# Patient Record
Sex: MALE | Race: WHITE | Employment: FULL TIME | URBAN - METROPOLITAN AREA
[De-identification: names, ages, dates, MRNs, and addresses within clinical notes are randomized per-mention and may not be internally consistent; named-entity substitution may affect disease eponyms.]

---

## 2018-02-13 PROCEDURE — 88305 TISSUE EXAM BY PATHOLOGIST: CPT | Performed by: UROLOGY

## 2018-02-13 PROCEDURE — 88344 IMHCHEM/IMCYTCHM EA MLT ANTB: CPT | Performed by: UROLOGY

## 2018-04-04 PROBLEM — L71.0 PERIORAL DERMATITIS: Status: ACTIVE | Noted: 2018-04-04

## 2018-09-13 ENCOUNTER — APPOINTMENT (OUTPATIENT)
Dept: GENERAL RADIOLOGY | Facility: HOSPITAL | Age: 63
DRG: 309 | End: 2018-09-13
Payer: COMMERCIAL

## 2018-09-13 ENCOUNTER — HOSPITAL ENCOUNTER (INPATIENT)
Facility: HOSPITAL | Age: 63
LOS: 1 days | Discharge: HOME | DRG: 309 | End: 2018-09-14
Attending: EMERGENCY MEDICINE | Admitting: HOSPITALIST
Payer: COMMERCIAL

## 2018-09-13 DIAGNOSIS — I48.91 ATRIAL FIBRILLATION WITH RAPID VENTRICULAR RESPONSE (HCC): Primary | ICD-10-CM

## 2018-09-13 LAB
ALBUMIN SERPL-MCNC: 3.8 G/DL (ref 3.5–4.8)
ALBUMIN/GLOB SERPL: 1.1 {RATIO} (ref 1–2)
ALP LIVER SERPL-CCNC: 106 U/L (ref 45–117)
ALT SERPL-CCNC: 22 U/L (ref 17–63)
ANION GAP SERPL CALC-SCNC: 7 MMOL/L (ref 0–18)
AST SERPL-CCNC: 15 U/L (ref 15–41)
ATRIAL RATE: 119 BPM
BASOPHILS # BLD AUTO: 0.02 X10(3) UL (ref 0–0.1)
BASOPHILS NFR BLD AUTO: 0.3 %
BILIRUB SERPL-MCNC: 0.3 MG/DL (ref 0.1–2)
BUN BLD-MCNC: 17 MG/DL (ref 8–20)
BUN/CREAT SERPL: 19.5 (ref 10–20)
CALCIUM BLD-MCNC: 8.8 MG/DL (ref 8.3–10.3)
CHLORIDE SERPL-SCNC: 110 MMOL/L (ref 101–111)
CO2 SERPL-SCNC: 25 MMOL/L (ref 22–32)
CREAT BLD-MCNC: 0.87 MG/DL (ref 0.7–1.3)
EOSINOPHIL # BLD AUTO: 0.08 X10(3) UL (ref 0–0.3)
EOSINOPHIL NFR BLD AUTO: 1.2 %
ERYTHROCYTE [DISTWIDTH] IN BLOOD BY AUTOMATED COUNT: 12.6 % (ref 11.5–16)
GLOBULIN PLAS-MCNC: 3.6 G/DL (ref 2.5–4)
GLUCOSE BLD-MCNC: 111 MG/DL (ref 70–99)
HCT VFR BLD AUTO: 45.9 % (ref 37–53)
HGB BLD-MCNC: 15.6 G/DL (ref 13–17)
IMMATURE GRANULOCYTE COUNT: 0.01 X10(3) UL (ref 0–1)
IMMATURE GRANULOCYTE RATIO %: 0.2 %
LYMPHOCYTES # BLD AUTO: 1.69 X10(3) UL (ref 0.9–4)
LYMPHOCYTES NFR BLD AUTO: 25.9 %
M PROTEIN MFR SERPL ELPH: 7.4 G/DL (ref 6.1–8.3)
MCH RBC QN AUTO: 31 PG (ref 27–33.2)
MCHC RBC AUTO-ENTMCNC: 34 G/DL (ref 31–37)
MCV RBC AUTO: 91.1 FL (ref 80–99)
MONOCYTES # BLD AUTO: 0.66 X10(3) UL (ref 0.1–1)
MONOCYTES NFR BLD AUTO: 10.1 %
NEUTROPHIL ABS PRELIM: 4.06 X10 (3) UL (ref 1.3–6.7)
NEUTROPHILS # BLD AUTO: 4.06 X10(3) UL (ref 1.3–6.7)
NEUTROPHILS NFR BLD AUTO: 62.3 %
OSMOLALITY SERPL CALC.SUM OF ELEC: 296 MOSM/KG (ref 275–295)
PLATELET # BLD AUTO: 194 10(3)UL (ref 150–450)
POTASSIUM SERPL-SCNC: 3.9 MMOL/L (ref 3.6–5.1)
PRO-BETA NATRIURETIC PEPTIDE: 112 PG/ML (ref ?–125)
Q-T INTERVAL: 268 MS
QRS DURATION: 82 MS
QTC CALCULATION (BEZET): 391 MS
R AXIS: 19 DEGREES
RBC # BLD AUTO: 5.04 X10(6)UL (ref 4.3–5.7)
RED CELL DISTRIBUTION WIDTH-SD: 41.4 FL (ref 35.1–46.3)
SODIUM SERPL-SCNC: 142 MMOL/L (ref 136–144)
T AXIS: 25 DEGREES
TROPONIN I SERPL-MCNC: <0.046 NG/ML (ref ?–0.05)
VENTRICULAR RATE: 128 BPM
WBC # BLD AUTO: 6.5 X10(3) UL (ref 4–13)

## 2018-09-13 PROCEDURE — 83880 ASSAY OF NATRIURETIC PEPTIDE: CPT | Performed by: EMERGENCY MEDICINE

## 2018-09-13 PROCEDURE — 80053 COMPREHEN METABOLIC PANEL: CPT | Performed by: EMERGENCY MEDICINE

## 2018-09-13 PROCEDURE — 85025 COMPLETE CBC W/AUTO DIFF WBC: CPT | Performed by: EMERGENCY MEDICINE

## 2018-09-13 PROCEDURE — 71045 X-RAY EXAM CHEST 1 VIEW: CPT | Performed by: EMERGENCY MEDICINE

## 2018-09-13 PROCEDURE — 99291 CRITICAL CARE FIRST HOUR: CPT

## 2018-09-13 PROCEDURE — 85025 COMPLETE CBC W/AUTO DIFF WBC: CPT

## 2018-09-13 PROCEDURE — 84484 ASSAY OF TROPONIN QUANT: CPT | Performed by: EMERGENCY MEDICINE

## 2018-09-13 PROCEDURE — 93010 ELECTROCARDIOGRAM REPORT: CPT

## 2018-09-13 PROCEDURE — 96365 THER/PROPH/DIAG IV INF INIT: CPT

## 2018-09-13 PROCEDURE — 93005 ELECTROCARDIOGRAM TRACING: CPT

## 2018-09-13 PROCEDURE — 80053 COMPREHEN METABOLIC PANEL: CPT

## 2018-09-13 PROCEDURE — 96372 THER/PROPH/DIAG INJ SC/IM: CPT

## 2018-09-13 PROCEDURE — 84484 ASSAY OF TROPONIN QUANT: CPT

## 2018-09-13 RX ORDER — ATORVASTATIN CALCIUM 10 MG/1
10 TABLET, FILM COATED ORAL NIGHTLY
COMMUNITY
End: 2018-12-24

## 2018-09-13 RX ORDER — ATORVASTATIN CALCIUM 10 MG/1
10 TABLET, FILM COATED ORAL NIGHTLY
Status: DISCONTINUED | OUTPATIENT
Start: 2018-09-13 | End: 2018-09-14

## 2018-09-13 RX ORDER — ASPIRIN 81 MG/1
324 TABLET, CHEWABLE ORAL ONCE
Status: COMPLETED | OUTPATIENT
Start: 2018-09-13 | End: 2018-09-13

## 2018-09-13 RX ORDER — ENOXAPARIN SODIUM 100 MG/ML
80 INJECTION SUBCUTANEOUS ONCE
Status: COMPLETED | OUTPATIENT
Start: 2018-09-13 | End: 2018-09-13

## 2018-09-13 RX ORDER — ALFUZOSIN HYDROCHLORIDE 10 MG/1
10 TABLET, EXTENDED RELEASE ORAL
Status: DISCONTINUED | OUTPATIENT
Start: 2018-09-14 | End: 2018-09-14

## 2018-09-13 RX ORDER — METOPROLOL SUCCINATE 25 MG/1
25 TABLET, EXTENDED RELEASE ORAL DAILY
COMMUNITY
End: 2018-12-24

## 2018-09-13 RX ORDER — METOPROLOL SUCCINATE 25 MG/1
25 TABLET, EXTENDED RELEASE ORAL
Status: DISCONTINUED | OUTPATIENT
Start: 2018-09-14 | End: 2018-09-14

## 2018-09-13 RX ORDER — TAMSULOSIN HYDROCHLORIDE 0.4 MG/1
0.4 CAPSULE ORAL DAILY
COMMUNITY
End: 2019-04-25

## 2018-09-13 NOTE — PROGRESS NOTES
09/13/18 1833 09/13/18 1835 09/13/18 1838   Vital Signs   /82 104/80 (!) 85/43   BP Location Left arm Left arm Left arm   BP Method Automatic Automatic Automatic   Patient Position Lying Sitting Standing     Dr. Anjelica Meza aware

## 2018-09-13 NOTE — H&P
[facility] hospitalist H+P  PCP Lonnie Corral MD    CC palpitations  HPI 61 yo male with hx of HTN, HL here with SOB and palpitations, diagnosed with A-fib with RVR.  During my visit no chest pain, no SOB, no abd pain, no cough, no bleeding, no palpations  Pain q cervical LAD  CV:  nl S1/S2  Pulm: CTA b/l  Abd; soft, not tender, +BS  Ext: no edema  Neuro CN II-XII grossly intact, strenght 5/5 in all four extremities  Vascular + b/l radial pulses  Psych calm, cooperative    Labs  Recent Labs   Lab  09/13/18   1630

## 2018-09-13 NOTE — ED PROVIDER NOTES
Patient Seen in: [facility] Emergency Department    History   Patient presents with:  Chest Pain Angina (cardiovascular)    Stated Complaint: cp    HPI    76-year-old male presents with palpitations and mild shortness of breath.   He reports that sean and clear   Heart: Irregularly irregular. No murmur, gallop, rub. Abdomen: Soft and nontender. No abdominal masses.   No peritoneal signs   Extremities: no edema, normal peripheral pulses   Neuro: Alert oriented and nonfocal   Skin: no rashes or nodules Findings concerning for early congestive failure. Clinical correlation recommended. Mild atelectasis/scarring in the lower lungs.      Dictated by: Rosy Florentino MD on 9/13/2018 at 17:18     Approved by: Rosy Florentino MD          A total of 37 minutes

## 2018-09-14 ENCOUNTER — APPOINTMENT (OUTPATIENT)
Dept: CV DIAGNOSTICS | Facility: HOSPITAL | Age: 63
DRG: 309 | End: 2018-09-14
Attending: HOSPITALIST
Payer: COMMERCIAL

## 2018-09-14 VITALS
TEMPERATURE: 98 F | DIASTOLIC BLOOD PRESSURE: 72 MMHG | SYSTOLIC BLOOD PRESSURE: 107 MMHG | WEIGHT: 183.63 LBS | OXYGEN SATURATION: 98 % | RESPIRATION RATE: 20 BRPM | HEIGHT: 72 IN | BODY MASS INDEX: 24.87 KG/M2 | HEART RATE: 75 BPM

## 2018-09-14 PROCEDURE — 93306 TTE W/DOPPLER COMPLETE: CPT | Performed by: HOSPITALIST

## 2018-09-14 PROCEDURE — 5A2204Z RESTORATION OF CARDIAC RHYTHM, SINGLE: ICD-10-PCS | Performed by: INTERNAL MEDICINE

## 2018-09-14 PROCEDURE — 92960 CARDIOVERSION ELECTRIC EXT: CPT

## 2018-09-14 RX ORDER — SODIUM CHLORIDE 9 MG/ML
INJECTION, SOLUTION INTRAVENOUS CONTINUOUS
Status: DISCONTINUED | OUTPATIENT
Start: 2018-09-14 | End: 2018-09-14

## 2018-09-14 NOTE — PROGRESS NOTES
ALERT AND OX3. NO SOB ROOM AIR. TELE NSR WITH HR-70S.BRIAN. DIET WELL. DC INSTRUCTION GIVEN-VERBALIZED UNDERSTANDING. DC TELE. DC HL.

## 2018-09-14 NOTE — CONSULTS
[facility] Cardiology Consultation    Fuad Hortenica Patient Status:  Inpatient    1955 MRN [id number]   [facility] 8NE-A Attending Tova Zabala MD   Hosp Day # 0 PCP Bryson Mckay MD     Reason for Consultation:  Afib      History of Pr kg)  09/13/18 1630 : 184 lb (83.5 kg)  04/03/18 0818 : 185 lb (83.9 kg)  01/30/18 1017 : 178 lb (80.7 kg)          General: No apparent distress  HEENT: No focal deficits. Neck: supple. JVP normal  Cardiac: Irregular rhythm, S1, S2 normal,  rub or gallop.

## 2018-09-14 NOTE — DISCHARGE SUMMARY
[facility]  Discharge Summary    Jenelle Dent Patient Status:  Inpatient    1955 MRN [id number]   [facility] 8NE-A Attending Juanita Blandon MD   [facility] Day # 1 PCP Imtiaz Cowart MD     Date of Admission: 2018    Date of Fatoumata Johnston taking these medications    apixaban 5 MG Oral Tab  Take 1 tablet (5 mg total) by mouth 2 (two) times daily. Qty: 60 tablet Refills: 0      CONTINUE these medications which have NOT CHANGED    tamsulosin HCl 0.4 MG Oral Cap  Take 0.4 mg by mouth daily.

## 2018-09-14 NOTE — PROGRESS NOTES
[facility] Cardiology Progress Note        Litzy Patrick Patient Status:  Inpatient    1955 MRN [id number]   [facility] 8NE-A Attending Tr Kan MD   Hosp Day # 1 PCP Osmar Merlos MD     Subjective:  Re Received lovenox in the ER. now on Eliquis. Other than HTN and age, no clear cause. 2. HTN  3. HL  4. BPH  5. Bicuspid aortic valve (echo 9/14/18) - mild AS, mild to moderate AI. Mild LAE. Low normal EF. Plan:  Await echo.   Plan for [facility] t

## 2018-09-14 NOTE — CM/SW NOTE
Patient started on eliquis 5 mg bid--call placed to patient's pharmacy [facility] [phone]--XVFA per month $85.39--to provide patient with eliquis savings card--given

## 2018-09-14 NOTE — PROCEDURES
[facility]  Cardioversion Note    [facility]   [facility] [id number] MRN [id number]   Admission Date 9/13/2018 Procedure Date 9/14/2018   Attending Physician Josiah Mcarthur MD Procedure Physician Kana Vazquez MD     Pre-Op

## 2018-09-14 NOTE — PROGRESS NOTES
[facility] hospitalist daily note  Seen/examined on 9/14/18    S; no chest pain, no SOB, no abd pain, no nausea/emesis    Medications in Epic    PE.    09/14/18  0807   BP: 105/74   Pulse: 73   Resp: 18   Temp: 98.1 °F (36.7 °C)     Gen: awake, alert, no respirat

## 2019-02-17 ENCOUNTER — APPOINTMENT (OUTPATIENT)
Dept: GENERAL RADIOLOGY | Facility: HOSPITAL | Age: 64
DRG: 309 | End: 2019-02-17
Attending: EMERGENCY MEDICINE
Payer: COMMERCIAL

## 2019-02-17 ENCOUNTER — HOSPITAL ENCOUNTER (INPATIENT)
Facility: HOSPITAL | Age: 64
LOS: 1 days | Discharge: HOME | DRG: 309 | End: 2019-02-18
Attending: EMERGENCY MEDICINE | Admitting: INTERNAL MEDICINE
Payer: COMMERCIAL

## 2019-02-17 DIAGNOSIS — I48.91 ATRIAL FIBRILLATION WITH RAPID VENTRICULAR RESPONSE (HCC): Primary | ICD-10-CM

## 2019-02-17 LAB
ALBUMIN SERPL-MCNC: 3.8 G/DL (ref 3.4–5)
ALBUMIN/GLOB SERPL: 1.1 {RATIO} (ref 1–2)
ALP LIVER SERPL-CCNC: 104 U/L (ref 45–117)
ALT SERPL-CCNC: 40 U/L (ref 16–61)
ANION GAP SERPL CALC-SCNC: 5 MMOL/L (ref 0–18)
APTT PPP: 30.9 SECONDS (ref 26.1–34.6)
AST SERPL-CCNC: 19 U/L (ref 15–37)
ATRIAL RATE: 136 BPM
BASOPHILS # BLD AUTO: 0.03 X10(3) UL (ref 0–0.2)
BASOPHILS NFR BLD AUTO: 0.4 %
BILIRUB SERPL-MCNC: 0.6 MG/DL (ref 0.1–2)
BUN BLD-MCNC: 10 MG/DL (ref 7–18)
BUN/CREAT SERPL: 12.5 (ref 10–20)
CALCIUM BLD-MCNC: 8.9 MG/DL (ref 8.5–10.1)
CHLORIDE SERPL-SCNC: 111 MMOL/L (ref 98–107)
CO2 SERPL-SCNC: 27 MMOL/L (ref 21–32)
CREAT BLD-MCNC: 0.8 MG/DL (ref 0.7–1.3)
DEPRECATED RDW RBC AUTO: 43.5 FL (ref 35.1–46.3)
EOSINOPHIL # BLD AUTO: 0.08 X10(3) UL (ref 0–0.7)
EOSINOPHIL NFR BLD AUTO: 1.2 %
ERYTHROCYTE [DISTWIDTH] IN BLOOD BY AUTOMATED COUNT: 13.5 % (ref 11–15)
GLOBULIN PLAS-MCNC: 3.6 G/DL (ref 2.8–4.4)
GLUCOSE BLD-MCNC: 100 MG/DL (ref 70–99)
HCT VFR BLD AUTO: 44.7 % (ref 39–53)
HGB BLD-MCNC: 15.9 G/DL (ref 13–17.5)
IMM GRANULOCYTES # BLD AUTO: 0.02 X10(3) UL (ref 0–1)
IMM GRANULOCYTES NFR BLD: 0.3 %
INR BLD: 0.94 (ref 0.9–1.1)
LYMPHOCYTES # BLD AUTO: 1.49 X10(3) UL (ref 1–4)
LYMPHOCYTES NFR BLD AUTO: 21.9 %
M PROTEIN MFR SERPL ELPH: 7.4 G/DL (ref 6.4–8.2)
MCH RBC QN AUTO: 33 PG (ref 26–34)
MCHC RBC AUTO-ENTMCNC: 35.6 G/DL (ref 31–37)
MCV RBC AUTO: 92.7 FL (ref 80–100)
MONOCYTES # BLD AUTO: 0.66 X10(3) UL (ref 0.1–1)
MONOCYTES NFR BLD AUTO: 9.7 %
NEUTROPHILS # BLD AUTO: 4.53 X10 (3) UL (ref 1.5–7.7)
NEUTROPHILS # BLD AUTO: 4.53 X10(3) UL (ref 1.5–7.7)
NEUTROPHILS NFR BLD AUTO: 66.5 %
OSMOLALITY SERPL CALC.SUM OF ELEC: 295 MOSM/KG (ref 275–295)
PLATELET # BLD AUTO: 220 10(3)UL (ref 150–450)
POTASSIUM SERPL-SCNC: 4.1 MMOL/L (ref 3.5–5.1)
PSA SERPL DL<=0.01 NG/ML-MCNC: 13 SECONDS (ref 12.4–14.7)
Q-T INTERVAL: 298 MS
QRS DURATION: 78 MS
QTC CALCULATION (BEZET): 453 MS
R AXIS: -2 DEGREES
RBC # BLD AUTO: 4.82 X10(6)UL (ref 4.3–5.7)
SODIUM SERPL-SCNC: 143 MMOL/L (ref 136–145)
T AXIS: 12 DEGREES
TROPONIN I SERPL-MCNC: <0.045 NG/ML (ref ?–0.04)
TSI SER-ACNC: 0.56 MIU/ML (ref 0.36–3.74)
VENTRICULAR RATE: 139 BPM
WBC # BLD AUTO: 6.8 X10(3) UL (ref 4–11)

## 2019-02-17 PROCEDURE — 96372 THER/PROPH/DIAG INJ SC/IM: CPT

## 2019-02-17 PROCEDURE — 99291 CRITICAL CARE FIRST HOUR: CPT

## 2019-02-17 PROCEDURE — 80053 COMPREHEN METABOLIC PANEL: CPT | Performed by: EMERGENCY MEDICINE

## 2019-02-17 PROCEDURE — 85610 PROTHROMBIN TIME: CPT | Performed by: EMERGENCY MEDICINE

## 2019-02-17 PROCEDURE — 84484 ASSAY OF TROPONIN QUANT: CPT | Performed by: EMERGENCY MEDICINE

## 2019-02-17 PROCEDURE — 96365 THER/PROPH/DIAG IV INF INIT: CPT

## 2019-02-17 PROCEDURE — 96366 THER/PROPH/DIAG IV INF ADDON: CPT

## 2019-02-17 PROCEDURE — 93010 ELECTROCARDIOGRAM REPORT: CPT

## 2019-02-17 PROCEDURE — 85025 COMPLETE CBC W/AUTO DIFF WBC: CPT | Performed by: EMERGENCY MEDICINE

## 2019-02-17 PROCEDURE — 71045 X-RAY EXAM CHEST 1 VIEW: CPT | Performed by: EMERGENCY MEDICINE

## 2019-02-17 PROCEDURE — 84443 ASSAY THYROID STIM HORMONE: CPT | Performed by: HOSPITALIST

## 2019-02-17 PROCEDURE — 93005 ELECTROCARDIOGRAM TRACING: CPT

## 2019-02-17 PROCEDURE — 85730 THROMBOPLASTIN TIME PARTIAL: CPT | Performed by: EMERGENCY MEDICINE

## 2019-02-17 RX ORDER — ALFUZOSIN HYDROCHLORIDE 10 MG/1
10 TABLET, EXTENDED RELEASE ORAL
Status: DISCONTINUED | OUTPATIENT
Start: 2019-02-18 | End: 2019-02-18

## 2019-02-17 RX ORDER — ENOXAPARIN SODIUM 100 MG/ML
80 INJECTION SUBCUTANEOUS ONCE
Status: COMPLETED | OUTPATIENT
Start: 2019-02-17 | End: 2019-02-17

## 2019-02-17 RX ORDER — ATORVASTATIN CALCIUM 10 MG/1
10 TABLET, FILM COATED ORAL NIGHTLY
Status: DISCONTINUED | OUTPATIENT
Start: 2019-02-17 | End: 2019-02-18

## 2019-02-17 RX ORDER — ASPIRIN 81 MG/1
81 TABLET, CHEWABLE ORAL DAILY
Status: DISCONTINUED | OUTPATIENT
Start: 2019-02-18 | End: 2019-02-18

## 2019-02-17 RX ORDER — METOPROLOL SUCCINATE 50 MG/1
50 TABLET, EXTENDED RELEASE ORAL DAILY
Status: DISCONTINUED | OUTPATIENT
Start: 2019-02-18 | End: 2019-02-18

## 2019-02-17 NOTE — ED PROVIDER NOTES
Patient Seen in: [facility] Emergency Department    History   Patient presents with:  Arrythmia/Palpitations (cardiovascular)    Stated Complaint: palpitations, hx of afib, cardioverted in September, on metoprolol, HR  u*    HPI    Patient pre well-nourished. HENT:   Head: Normocephalic. Mouth/Throat: Oropharynx is clear and moist.   Eyes: EOM are normal.   Neck: Normal range of motion. Neck supple. Cardiovascular: Normal rate, regular rhythm, normal heart sounds and intact distal pulses. 1115  Value: TROPONIN: <0.045  Comment: Other labs unremarkable     Patient was given IV Cardizem bolus and drip which was titrated with moderation of his heart rate, but patient remained in atrial fibrillation.   [facility]

## 2019-02-17 NOTE — ED NOTES
Patient presents to the ER from home via triage stating heart palpitations that began last night. Patient notes slight chest pain but denies shortness of breath. Respirations are easy and nonlabored. Lungs are clear bilaterally upon auscultation.   All p

## 2019-02-17 NOTE — CONSULTS
[facility] Cardiology  Consultation Note      Cheryl Jonnykevin Patient Status:  Inpatient    1955 MRN [id number]   [facility] 8NE-A Attending Maryellen Gotti MD   Hosp Day # 0 PCP Donna Arias MD     Reason for [facility] cholesterol    • HTN (hypertension)    • Hyperlipidemia    • Visual impairment        History reviewed. No pertinent surgical history.     Family History  family history includes Heart Disease in his father; Stroke in his maternal grandmother; Stroke (age o normally  Psychiatric: Normal mood and affect; answers questions appropriately  Dermatologic: No rashes; normal skin turgor    Diagnostic testing:    EKG: AF with RVR    Labs:   Lab Results   Component Value Date    INR 0.94 02/17/2019        Lab Results

## 2019-02-17 NOTE — PLAN OF CARE
CARDIOVASCULAR - ADULT    • Maintains optimal cardiac output and hemodynamic stability Progressing    • Absence of cardiac arrhythmias or at baseline Progressing        Afib rates controlled at this time. Patient from ER stable and no sob at this time.  John Camacho

## 2019-02-17 NOTE — H&P
[facility] Hospitalist H&P       CC: Patient presents with:  Arrythmia/Palpitations (cardiovascular)       PCP: Gurwinder Cueto MD    History of Present Illness:  Patient is a 58year old male with PMH sig for pAfib s/p cardioversion 9/2018, HTN, HL, BPH present [START ON 2/18/2019] aspirin  81 mg Oral Daily     Continuous Infusing Medication:  • diltiazem 10 mg/hr (02/17/19 1114)     PRN Medication:diltiazem (CARDIZEM) bolus from bag       Soc Hx  Social History    Tobacco Use      Smoking status: Former Smoker history at this time. FINDINGS:   Cardiac silhouette is mildly prominent. Pulmonary vasculature demonstrates minimal apical redistribution. No consolidation, pleural effusion or pneumothorax. CONCLUSION:  No consolidation.      Dictated by: Elian Davila

## 2019-02-18 VITALS
DIASTOLIC BLOOD PRESSURE: 74 MMHG | RESPIRATION RATE: 20 BRPM | HEART RATE: 100 BPM | OXYGEN SATURATION: 94 % | BODY MASS INDEX: 26 KG/M2 | TEMPERATURE: 98 F | WEIGHT: 190 LBS | SYSTOLIC BLOOD PRESSURE: 122 MMHG

## 2019-02-18 LAB
ANION GAP SERPL CALC-SCNC: 5 MMOL/L (ref 0–18)
ATRIAL RATE: 65 BPM
ATRIAL RATE: 87 BPM
BASOPHILS # BLD AUTO: 0.02 X10(3) UL (ref 0–0.2)
BASOPHILS NFR BLD AUTO: 0.4 %
BUN BLD-MCNC: 12 MG/DL (ref 7–18)
BUN/CREAT SERPL: 13.8 (ref 10–20)
CALCIUM BLD-MCNC: 8.6 MG/DL (ref 8.5–10.1)
CHLORIDE SERPL-SCNC: 110 MMOL/L (ref 98–107)
CO2 SERPL-SCNC: 28 MMOL/L (ref 21–32)
CREAT BLD-MCNC: 0.87 MG/DL (ref 0.7–1.3)
DEPRECATED RDW RBC AUTO: 42.9 FL (ref 35.1–46.3)
EOSINOPHIL # BLD AUTO: 0.06 X10(3) UL (ref 0–0.7)
EOSINOPHIL NFR BLD AUTO: 1.2 %
ERYTHROCYTE [DISTWIDTH] IN BLOOD BY AUTOMATED COUNT: 12.7 % (ref 11–15)
GLUCOSE BLD-MCNC: 99 MG/DL (ref 70–99)
HCT VFR BLD AUTO: 43.9 % (ref 39–53)
HGB BLD-MCNC: 14.7 G/DL (ref 13–17.5)
IMM GRANULOCYTES # BLD AUTO: 0.01 X10(3) UL (ref 0–1)
IMM GRANULOCYTES NFR BLD: 0.2 %
LYMPHOCYTES # BLD AUTO: 1.43 X10(3) UL (ref 1–4)
LYMPHOCYTES NFR BLD AUTO: 27.7 %
MCH RBC QN AUTO: 31.1 PG (ref 26–34)
MCHC RBC AUTO-ENTMCNC: 33.5 G/DL (ref 31–37)
MCV RBC AUTO: 92.8 FL (ref 80–100)
MONOCYTES # BLD AUTO: 0.51 X10(3) UL (ref 0.1–1)
MONOCYTES NFR BLD AUTO: 9.9 %
NEUTROPHILS # BLD AUTO: 3.14 X10 (3) UL (ref 1.5–7.7)
NEUTROPHILS # BLD AUTO: 3.14 X10(3) UL (ref 1.5–7.7)
NEUTROPHILS NFR BLD AUTO: 60.6 %
OSMOLALITY SERPL CALC.SUM OF ELEC: 296 MOSM/KG (ref 275–295)
P AXIS: 6 DEGREES
P-R INTERVAL: 148 MS
PLATELET # BLD AUTO: 198 10(3)UL (ref 150–450)
POTASSIUM SERPL-SCNC: 3.9 MMOL/L (ref 3.5–5.1)
Q-T INTERVAL: 316 MS
Q-T INTERVAL: 414 MS
QRS DURATION: 78 MS
QRS DURATION: 78 MS
QTC CALCULATION (BEZET): 419 MS
QTC CALCULATION (BEZET): 430 MS
R AXIS: 13 DEGREES
R AXIS: 57 DEGREES
RBC # BLD AUTO: 4.73 X10(6)UL (ref 4.3–5.7)
SODIUM SERPL-SCNC: 143 MMOL/L (ref 136–145)
T AXIS: 13 DEGREES
T AXIS: 20 DEGREES
VENTRICULAR RATE: 106 BPM
VENTRICULAR RATE: 65 BPM
WBC # BLD AUTO: 5.2 X10(3) UL (ref 4–11)

## 2019-02-18 PROCEDURE — 85025 COMPLETE CBC W/AUTO DIFF WBC: CPT | Performed by: HOSPITALIST

## 2019-02-18 PROCEDURE — 80048 BASIC METABOLIC PNL TOTAL CA: CPT | Performed by: HOSPITALIST

## 2019-02-18 PROCEDURE — 93010 ELECTROCARDIOGRAM REPORT: CPT | Performed by: INTERNAL MEDICINE

## 2019-02-18 PROCEDURE — 93005 ELECTROCARDIOGRAM TRACING: CPT

## 2019-02-18 PROCEDURE — 5A2204Z RESTORATION OF CARDIAC RHYTHM, SINGLE: ICD-10-PCS | Performed by: INTERNAL MEDICINE

## 2019-02-18 RX ORDER — SODIUM CHLORIDE 9 MG/ML
INJECTION, SOLUTION INTRAVENOUS CONTINUOUS
Status: DISCONTINUED | OUTPATIENT
Start: 2019-02-18 | End: 2019-02-18

## 2019-02-18 NOTE — PROGRESS NOTES
[facility] Hospitalist Progress Note     PCP: Max Mcdonald MD    Chief Complaint: follow-up    Overnight/Interim Events:      SUBJECTIVE:  Pt laying in bed. On 2L. No cp/palpitations. . Weak but feels good. OBJECTIVE:  Temp:  [97.2 °F (36.2 °C)-98. 1 (02/18/19 1020)            Assessment/Plan:     # Afib with RVR  - h/o previous hospitalization 9/2018, records reviewed.  He had DCCV that admission.   - CXR wnl  - HR improving on cardizem gtt  - trop negative x 1  - TSH wnl  - lovenox 80mg given in ED -d

## 2019-02-18 NOTE — PROGRESS NOTES
[facility] Cardiology Progress Note        Griselda Oreilly Patient Status:  Inpatient    1955 MRN [id number]   [facility] 8NE-A Attending Hernando Moreno MD   Hosp Day # 1 PCP MD Alix Tinoco 13.0   INR  0.94                 Impression:  1. PAF - one highly symptomatic episode 9/13/18. [facility] 9/14/18. No issues until represented 2/17 with recurrent afib. Now rate controlled. On Eliquis. 2. HTN  3. HL          Plan:  [facility] today.   multaq started

## 2019-02-18 NOTE — PROCEDURES
[facility]  Cardioversion Note    Rama Vásquez   [facility] [id number] MRN [id number]   Admission Date 2/17/2019 Procedure Date 2/18/2019   Attending Physician Mayra Sorensen MD Procedure Physician Josefa Mullen MD

## 2019-04-22 PROBLEM — I48.0 PAROXYSMAL ATRIAL FIBRILLATION (HCC): Status: ACTIVE | Noted: 2018-09-13

## 2019-06-25 PROCEDURE — 88305 TISSUE EXAM BY PATHOLOGIST: CPT | Performed by: FAMILY MEDICINE

## 2019-11-08 ENCOUNTER — APPOINTMENT (OUTPATIENT)
Dept: GENERAL RADIOLOGY | Facility: HOSPITAL | Age: 64
End: 2019-11-08
Attending: EMERGENCY MEDICINE
Payer: COMMERCIAL

## 2019-11-08 ENCOUNTER — APPOINTMENT (OUTPATIENT)
Dept: CV DIAGNOSTICS | Facility: HOSPITAL | Age: 64
End: 2019-11-08
Attending: INTERNAL MEDICINE
Payer: COMMERCIAL

## 2019-11-08 ENCOUNTER — HOSPITAL ENCOUNTER (OUTPATIENT)
Facility: HOSPITAL | Age: 64
Setting detail: OBSERVATION
Discharge: HOME | End: 2019-11-08
Attending: EMERGENCY MEDICINE | Admitting: INTERNAL MEDICINE
Payer: COMMERCIAL

## 2019-11-08 VITALS
WEIGHT: 192 LBS | SYSTOLIC BLOOD PRESSURE: 108 MMHG | OXYGEN SATURATION: 97 % | RESPIRATION RATE: 18 BRPM | DIASTOLIC BLOOD PRESSURE: 67 MMHG | BODY MASS INDEX: 26.01 KG/M2 | TEMPERATURE: 98 F | HEART RATE: 63 BPM | HEIGHT: 72 IN

## 2019-11-08 DIAGNOSIS — I48.91 ATRIAL FIBRILLATION WITH RAPID VENTRICULAR RESPONSE (HCC): Primary | ICD-10-CM

## 2019-11-08 PROCEDURE — 93306 TTE W/DOPPLER COMPLETE: CPT | Performed by: INTERNAL MEDICINE

## 2019-11-08 PROCEDURE — 84484 ASSAY OF TROPONIN QUANT: CPT | Performed by: EMERGENCY MEDICINE

## 2019-11-08 PROCEDURE — 85730 THROMBOPLASTIN TIME PARTIAL: CPT | Performed by: EMERGENCY MEDICINE

## 2019-11-08 PROCEDURE — 96365 THER/PROPH/DIAG IV INF INIT: CPT

## 2019-11-08 PROCEDURE — 85610 PROTHROMBIN TIME: CPT | Performed by: EMERGENCY MEDICINE

## 2019-11-08 PROCEDURE — 80053 COMPREHEN METABOLIC PANEL: CPT | Performed by: EMERGENCY MEDICINE

## 2019-11-08 PROCEDURE — 93010 ELECTROCARDIOGRAM REPORT: CPT

## 2019-11-08 PROCEDURE — 96366 THER/PROPH/DIAG IV INF ADDON: CPT

## 2019-11-08 PROCEDURE — 93005 ELECTROCARDIOGRAM TRACING: CPT

## 2019-11-08 PROCEDURE — 92960 CARDIOVERSION ELECTRIC EXT: CPT

## 2019-11-08 PROCEDURE — 99285 EMERGENCY DEPT VISIT HI MDM: CPT

## 2019-11-08 PROCEDURE — 93010 ELECTROCARDIOGRAM REPORT: CPT | Performed by: INTERNAL MEDICINE

## 2019-11-08 PROCEDURE — 85025 COMPLETE CBC W/AUTO DIFF WBC: CPT | Performed by: EMERGENCY MEDICINE

## 2019-11-08 PROCEDURE — 5A2204Z RESTORATION OF CARDIAC RHYTHM, SINGLE: ICD-10-PCS | Performed by: INTERNAL MEDICINE

## 2019-11-08 PROCEDURE — 96372 THER/PROPH/DIAG INJ SC/IM: CPT

## 2019-11-08 PROCEDURE — 71045 X-RAY EXAM CHEST 1 VIEW: CPT | Performed by: EMERGENCY MEDICINE

## 2019-11-08 RX ORDER — ATORVASTATIN CALCIUM 10 MG/1
10 TABLET, FILM COATED ORAL NIGHTLY
Status: DISCONTINUED | OUTPATIENT
Start: 2019-11-08 | End: 2019-11-08

## 2019-11-08 RX ORDER — ASPIRIN 81 MG/1
81 TABLET ORAL DAILY
Status: ON HOLD | COMMUNITY
End: 2019-11-08

## 2019-11-08 RX ORDER — SODIUM CHLORIDE 9 MG/ML
1000 INJECTION, SOLUTION INTRAVENOUS ONCE
Status: COMPLETED | OUTPATIENT
Start: 2019-11-08 | End: 2019-11-08

## 2019-11-08 RX ORDER — ALFUZOSIN HYDROCHLORIDE 10 MG/1
10 TABLET, EXTENDED RELEASE ORAL
Status: DISCONTINUED | OUTPATIENT
Start: 2019-11-08 | End: 2019-11-08

## 2019-11-08 RX ORDER — ACETAMINOPHEN 325 MG/1
650 TABLET ORAL EVERY 6 HOURS PRN
Status: DISCONTINUED | OUTPATIENT
Start: 2019-11-08 | End: 2019-11-08

## 2019-11-08 RX ORDER — ENOXAPARIN SODIUM 100 MG/ML
1 INJECTION SUBCUTANEOUS ONCE
Status: COMPLETED | OUTPATIENT
Start: 2019-11-08 | End: 2019-11-08

## 2019-11-08 RX ORDER — ASPIRIN 81 MG/1
81 TABLET ORAL DAILY
Status: DISCONTINUED | OUTPATIENT
Start: 2019-11-08 | End: 2019-11-08

## 2019-11-08 NOTE — PLAN OF CARE
Assumed care at 1300. Pt A/O x4. RA. Controlled Afib on tele. Cardizem gtt infusing at 10 ml/hr. Consent obtained for cardioversion. Pt back to the floor in NSR. Cardizem gtt d/c'd. Will d/c home. All consults cleared for discharge.  Pt to start eliquis

## 2019-11-08 NOTE — ED PROVIDER NOTES
Patient Seen in: [facility] Emergency Department      History   Patient presents with:  Arrythmia/Palpitations (cardiovascular)    Stated Complaint: palpitation    HPI    27-year-old male with history of atrial fibrillation presents reporting palpit Ht 182.9 cm (6')   Wt 87.1 kg   SpO2 99%   BMI 26.04 kg/m²         Physical Exam    General:  Vitals as listed. No acute distress   HEENT: Sclerae anicteric. Conjunctivae show no pallor.   Oropharynx clear, mucous membranes moist   Neck: supple, no rigidi clear.  The costophrenic angles are sharp. There is no active disease seen.      Dictated by: Cristina Paredes MD on 11/08/2019 at 7:19     Approved by: Cristina Paredes MD on 11/08/2019 at 7:19      A total of 37 minutes of critical care time (exclusive of

## 2019-11-08 NOTE — CONSULTS
[facility] Electrophysiology Consult      Yoselin Norman Patient Status:  Observation    1955 MRN [id number]   [facility] 7NE-A Attending [facility], [facility] Day # 0 PCP MD Yoselin English is a Infusion Meds:  • diltiazem 5 mg/hr (11/08/19 1040)       Social:   reports that he has quit smoking. He smoked 0.00 packs per day. He has never used smokeless tobacco. He reports current alcohol use. He reports that he does not use drugs.     Family interference when a sample is collected from a person who is consuming high dose of biotin supplements resulting in biotin serum concentrations >100 ng/mL.  It is recommended that physicians ask all patients who may be on biotin supplementation to stop biot

## 2019-11-08 NOTE — H&P
[facility] Hospitalist Team  History and Physical       Assessment/Plan:     #Afib with RVR  -s/p cardioversion  -eliquis per cards  -cont multaq    #HTN- cont meds    PPX: eliquis    DISPO: ok for dc    Mohit Albarado DO  [facility] Hospitalist  [phone]    [address] dysuria, frequency or urgency. MUSCULOSKELETAL:  No arthritis  SKIN:  No change in skin, hair or nails. NEUROLOGIC:  No paresthesias, weakness, or numbness. PSYCHIATRIC:  No disorder of thought or mood.   ENDOCRINE:  No heat or cold intolerance, polyuria 11/8/2019  PROCEDURE:  XR CHEST AP PORTABLE  (CPT=71045)  TECHNIQUE:  AP chest radiograph was obtained. COMPARISON:  [facility] , XR, XR CHEST AP PORTABLE  (CPT=71045), 2/17/2019, 11:09.   INDICATIONS:  palpitation, chest pain  PATIENT STATED HISTORY: (As mcgee Lisa Pandya MD  [address]  [phone]    Schedule an appointment as soon as possible for a visit          Hospital Course:      72 yo admitted with palpitation.  Found to be in Afib with rvr as he has in the past. He was start

## 2019-11-08 NOTE — PROGRESS NOTES
S/p successful cardioversion. 30 mg of brevital given. Pt awake and alert. EKG done. VSS. Will send pt back to floor via transport. Report called to floor.

## 2019-11-08 NOTE — CONSULTS
[facility] Cardiology Consultation    Fuad Burnett Patient Status:  Emergency    1955 MRN [id number]   Location [address] [facility] Attending Talib Sarah MD   Hosp Day # 0 PCP Bryson Mckay MD     Reason for Consultation:  PAF distress  HEENT: No focal deficits. Neck: supple. JVP normal  Cardiac: Irregular rhythm, S1, S2 normal,  rub or gallop. AoEM  Lungs: CTA  Abdomen: Soft, non-tender. Extremities: No edema  Neurologic: no focal deficits  Skin: Warm and dry.           Tele

## 2019-11-08 NOTE — PROCEDURES
Cardioversion      History:  71year old male with persistent atrial fibrillation on multaq who presents for a cardioversion. The risks and benefits of the procedure (including, but not limited to myocardial infarction, stroke, and death) were discussed.  Therese Ruff

## 2020-05-01 ENCOUNTER — HOSPITAL ENCOUNTER (EMERGENCY)
Facility: HOSPITAL | Age: 65
Discharge: HOME | End: 2020-05-01
Attending: EMERGENCY MEDICINE
Payer: COMMERCIAL

## 2020-05-01 VITALS
WEIGHT: 183 LBS | RESPIRATION RATE: 20 BRPM | DIASTOLIC BLOOD PRESSURE: 87 MMHG | HEART RATE: 111 BPM | BODY MASS INDEX: 24.79 KG/M2 | TEMPERATURE: 98 F | SYSTOLIC BLOOD PRESSURE: 125 MMHG | OXYGEN SATURATION: 97 % | HEIGHT: 72 IN

## 2020-05-01 DIAGNOSIS — I48.91 ATRIAL FIBRILLATION WITH RVR (HCC): Primary | ICD-10-CM

## 2020-05-01 PROCEDURE — 96361 HYDRATE IV INFUSION ADD-ON: CPT

## 2020-05-01 PROCEDURE — 93005 ELECTROCARDIOGRAM TRACING: CPT

## 2020-05-01 PROCEDURE — 80053 COMPREHEN METABOLIC PANEL: CPT | Performed by: EMERGENCY MEDICINE

## 2020-05-01 PROCEDURE — 93010 ELECTROCARDIOGRAM REPORT: CPT

## 2020-05-01 PROCEDURE — 96374 THER/PROPH/DIAG INJ IV PUSH: CPT

## 2020-05-01 PROCEDURE — 99285 EMERGENCY DEPT VISIT HI MDM: CPT

## 2020-05-01 PROCEDURE — 99284 EMERGENCY DEPT VISIT MOD MDM: CPT

## 2020-05-01 PROCEDURE — 85025 COMPLETE CBC W/AUTO DIFF WBC: CPT | Performed by: EMERGENCY MEDICINE

## 2020-05-01 RX ORDER — DILTIAZEM HYDROCHLORIDE 5 MG/ML
INJECTION INTRAVENOUS
Status: DISCONTINUED
Start: 2020-05-01 | End: 2020-05-02

## 2020-05-01 RX ORDER — DILTIAZEM HYDROCHLORIDE 5 MG/ML
10 INJECTION INTRAVENOUS ONCE
Status: COMPLETED | OUTPATIENT
Start: 2020-05-01 | End: 2020-05-01

## 2020-05-02 NOTE — ED INITIAL ASSESSMENT (HPI)
Patient here with c/o palpitations that started one hour PTA. Denies chest pain and SOB.   Hx of Afib RVR

## 2020-05-02 NOTE — ED PROVIDER NOTES
Patient Seen in: [facility] Emergency Department      History   Patient presents with:  Arrythmia/Palpitations    Stated Complaint: palpitations    HPI  This is 69-year-old man, history of atrial fibrillation on anticoagulation who presents today wi Exam      Physical Exam  Vitals signs and nursing note reviewed. General: This is a well-appearing older man sitting in bed in no acute distress. Appears younger than stated age. Head: Normocephalic and atraumatic.    Mouth/Throat:  Mucous membranes are 90s.  Electrolytes normal, patient requesting discharge home, advised him to follow-up with his primary doctor as well as his cardiologist return precautions discussed.           Disposition with atrial fibrillation and Plan     Clinical Impression:  Atrial

## 2020-05-02 NOTE — ED NOTES
Patient arrived to room from holding. Patient denies any chest pain at this time. States he did take his medications already, reports hx of afib. Patient wearing a mask. EKG done in holding, IV established in holding.

## (undated) NOTE — LETTER
To Whom it may concern,    Talita Smithjorge was hospitalized at [facility]  Can return to work on 9/17/18    Holly Ramos MD

## (undated) NOTE — LETTER
Consent to Procedure/Sedation    Date: SEPTRMBER 63,0633   Time: 2357    1. I authorize the performance upon Yoselin Mroekevin the following:    CARDIOVERTION    2.  I authorize Dr. Rashmi Conde (and whomever is designated as the doctor’s assistant), to ___________________________    ___________________    Witness: ____________________     Date: ______________    Printed: 2018   10:38 AM    Patient Name: Jimenez Denton        : 1955       Medical Record #: [id number]

## (undated) NOTE — LETTER
[facility]  Marybel Morgan 60 [address]  Consent for Procedure/Sedation    Date:SEPTEMBER 98,1715    Time:0850      1. I authorize the performance upon Hermes Fine the following:  CARDIOVERTION    2.  I authorize Melly Kaplan ________________________________    ___________________    Witness: _________________________      Date: ___________________    Printed: 2018   8:35 AM  Patient Name: Monisha Pruett        : 1955       Medical Record #: [id number]

## (undated) NOTE — LETTER
[facility] [address], [address]  Consent for Procedure/Sedation    Date: 11/8/19    Time: 1257      1. I authorize the performance upon Hermes Fine the following: Cardioversion.     2. I authorize  ________________________________    ___________________    Witness: _________________________      Date: ___________________    Printed: 2019   12:56 PM  Patient Name: Hyun Banda        : 1955       Medical Record #: [id number]

## (undated) NOTE — LETTER
[facility] [address], [address]  Consent for Procedure/Sedation    Date: 2/18/2019    Time: 0800      1. I authorize the performance upon Cheryl Kirby the following:  Cardioversion/AICD check    2.  I authorize Dr. Brea Mcgraw ________________________________    ___________________    Witness: _________________________      Date: ___________________    Printed: 2019   8:08 AM  Patient Name: Litzy Celina        : 1955       Medical Record #: [id number]